# Patient Record
Sex: MALE | Race: WHITE | Employment: FULL TIME | URBAN - METROPOLITAN AREA
[De-identification: names, ages, dates, MRNs, and addresses within clinical notes are randomized per-mention and may not be internally consistent; named-entity substitution may affect disease eponyms.]

---

## 2018-12-11 ENCOUNTER — HOSPITAL ENCOUNTER (EMERGENCY)
Facility: CLINIC | Age: 53
Discharge: HOME | End: 2018-12-11
Attending: EMERGENCY MEDICINE | Admitting: EMERGENCY MEDICINE
Payer: COMMERCIAL

## 2018-12-11 VITALS
WEIGHT: 151 LBS | DIASTOLIC BLOOD PRESSURE: 100 MMHG | OXYGEN SATURATION: 99 % | TEMPERATURE: 97.6 F | HEIGHT: 67 IN | SYSTOLIC BLOOD PRESSURE: 156 MMHG | BODY MASS INDEX: 23.7 KG/M2 | RESPIRATION RATE: 14 BRPM | HEART RATE: 77 BPM

## 2018-12-11 DIAGNOSIS — R79.89 ELEVATED LFTS: ICD-10-CM

## 2018-12-11 DIAGNOSIS — R00.2 PALPITATIONS: ICD-10-CM

## 2018-12-11 LAB
ALBUMIN SERPL-MCNC: 4.2 G/DL (ref 3.4–5)
ALP SERPL-CCNC: 67 U/L (ref 40–150)
ALT SERPL W P-5'-P-CCNC: 92 U/L (ref 0–70)
ANION GAP SERPL CALCULATED.3IONS-SCNC: 9 MMOL/L (ref 3–14)
AST SERPL W P-5'-P-CCNC: 81 U/L (ref 0–45)
BASOPHILS # BLD AUTO: 0.1 10E9/L (ref 0–0.2)
BASOPHILS NFR BLD AUTO: 0.9 %
BILIRUB SERPL-MCNC: 0.9 MG/DL (ref 0.2–1.3)
BUN SERPL-MCNC: 6 MG/DL (ref 7–30)
CALCIUM SERPL-MCNC: 9.1 MG/DL (ref 8.5–10.1)
CHLORIDE SERPL-SCNC: 107 MMOL/L (ref 94–109)
CK SERPL-CCNC: 115 U/L (ref 30–300)
CO2 SERPL-SCNC: 23 MMOL/L (ref 20–32)
CREAT SERPL-MCNC: 0.78 MG/DL (ref 0.66–1.25)
DIFFERENTIAL METHOD BLD: ABNORMAL
EOSINOPHIL # BLD AUTO: 0.3 10E9/L (ref 0–0.7)
EOSINOPHIL NFR BLD AUTO: 5 %
ERYTHROCYTE [DISTWIDTH] IN BLOOD BY AUTOMATED COUNT: 12.7 % (ref 10–15)
GFR SERPL CREATININE-BSD FRML MDRD: >90 ML/MIN/1.7M2
GLUCOSE SERPL-MCNC: 78 MG/DL (ref 70–99)
HCT VFR BLD AUTO: 41.6 % (ref 40–53)
HGB BLD-MCNC: 14.9 G/DL (ref 13.3–17.7)
IMM GRANULOCYTES # BLD: 0 10E9/L (ref 0–0.4)
IMM GRANULOCYTES NFR BLD: 0.2 %
INTERPRETATION ECG - MUSE: NORMAL
LYMPHOCYTES # BLD AUTO: 1.2 10E9/L (ref 0.8–5.3)
LYMPHOCYTES NFR BLD AUTO: 19.2 %
MAGNESIUM SERPL-MCNC: 2 MG/DL (ref 1.6–2.3)
MCH RBC QN AUTO: 33.9 PG (ref 26.5–33)
MCHC RBC AUTO-ENTMCNC: 35.8 G/DL (ref 31.5–36.5)
MCV RBC AUTO: 95 FL (ref 78–100)
MONOCYTES # BLD AUTO: 0.6 10E9/L (ref 0–1.3)
MONOCYTES NFR BLD AUTO: 9.6 %
NEUTROPHILS # BLD AUTO: 4.2 10E9/L (ref 1.6–8.3)
NEUTROPHILS NFR BLD AUTO: 65.1 %
NRBC # BLD AUTO: 0 10*3/UL
NRBC BLD AUTO-RTO: 0 /100
PLATELET # BLD AUTO: 167 10E9/L (ref 150–450)
POTASSIUM SERPL-SCNC: 3.7 MMOL/L (ref 3.4–5.3)
PROT SERPL-MCNC: 8.3 G/DL (ref 6.8–8.8)
RBC # BLD AUTO: 4.39 10E12/L (ref 4.4–5.9)
SODIUM SERPL-SCNC: 139 MMOL/L (ref 133–144)
TROPONIN I SERPL-MCNC: <0.015 UG/L (ref 0–0.04)
TSH SERPL DL<=0.005 MIU/L-ACNC: 2.53 MU/L (ref 0.4–4)
WBC # BLD AUTO: 6.5 10E9/L (ref 4–11)

## 2018-12-11 PROCEDURE — 93005 ELECTROCARDIOGRAM TRACING: CPT

## 2018-12-11 PROCEDURE — 82550 ASSAY OF CK (CPK): CPT | Performed by: EMERGENCY MEDICINE

## 2018-12-11 PROCEDURE — 84484 ASSAY OF TROPONIN QUANT: CPT | Performed by: EMERGENCY MEDICINE

## 2018-12-11 PROCEDURE — 83735 ASSAY OF MAGNESIUM: CPT | Performed by: EMERGENCY MEDICINE

## 2018-12-11 PROCEDURE — 84443 ASSAY THYROID STIM HORMONE: CPT | Performed by: EMERGENCY MEDICINE

## 2018-12-11 PROCEDURE — 80053 COMPREHEN METABOLIC PANEL: CPT | Performed by: EMERGENCY MEDICINE

## 2018-12-11 PROCEDURE — 99284 EMERGENCY DEPT VISIT MOD MDM: CPT | Mod: 25

## 2018-12-11 PROCEDURE — 25000128 H RX IP 250 OP 636: Performed by: EMERGENCY MEDICINE

## 2018-12-11 PROCEDURE — 85025 COMPLETE CBC W/AUTO DIFF WBC: CPT | Performed by: EMERGENCY MEDICINE

## 2018-12-11 PROCEDURE — 96361 HYDRATE IV INFUSION ADD-ON: CPT

## 2018-12-11 PROCEDURE — 96360 HYDRATION IV INFUSION INIT: CPT

## 2018-12-11 RX ADMIN — SODIUM CHLORIDE 1000 ML: 9 INJECTION, SOLUTION INTRAVENOUS at 16:39

## 2018-12-11 ASSESSMENT — ENCOUNTER SYMPTOMS
LIGHT-HEADEDNESS: 1
NAUSEA: 0
WEAKNESS: 0
CHILLS: 0
VOMITING: 0
FEVER: 0
PALPITATIONS: 1
SHORTNESS OF BREATH: 0
NUMBNESS: 0

## 2018-12-11 ASSESSMENT — MIFFLIN-ST. JEOR: SCORE: 1483.56

## 2018-12-11 NOTE — ED AVS SNAPSHOT
"     EMERGENCY DEPARTMENT: [phone]                                              INTERAGENCY TRANSFER FORM - LAB / IMAGING / EKG / EMG RESULTS   2018                   Hospital Admission Date: 2018  DIANE AKERS   : 1965  Sex: Male         Attending Provider:  Patel Caldera MD    Allergies:  No Known Allergies    Infection:  None   Service:  EMERGENCY ME    Ht:  1.702 m (5' 7\")   Wt:  68.5 kg (151 lb)   Admission Wt:  68.5 kg (151 lb)    BMI:  23.65 kg/m 2   BSA:  1.8 m 2            Patient PCP Information     Provider PCP Type    REYES DOMINGUEZ General         Lab Results - 3 Days      CK total [[id number]]  Resulted: 18 181, Result status: Final result   Ordering provider:  Patel Caldera MD  18 163 Resulting lab:  [facility]    Specimen Information    Type Source Collected On       18 1637          Components    Component Value Reference Range Flag Lab   CK Total 115 30 - 300 U/L   FrStHsLb            TSH with free T4 reflex [[id number]]  Resulted: 18 1720, Result status: Final result   Ordering provider:  Patel Caldera MD  18 1637 Resulting lab:  [facility]    Specimen Information    Type Source Collected On       18 1637          Components    Component Value Reference Range Flag Lab   TSH 2.53 0.40 - 4.00 mU/L   FrStHsLb            Magnesium [[id number]]  Resulted: 18 1717, Result status: Final result   Ordering provider:  Patel Caldera MD  18 1637 Resulting lab:  [facility]    Specimen Information    Type Source Collected On       18 1637          Components    Component Value Reference Range Flag Lab   Magnesium 2.0 1.6 - 2.3 mg/dL   FrStHsLb            Comprehensive metabolic panel [[id number]] (Abnormal)  Resulted: 18 1707, Result status: Final result   Ordering provider:  Patel Caldera MD  18 1638 Resulting lab:  [facility]    " Specimen Information    Type Source Collected On   Blood   12/11/18 1637          Components    Component Value Reference Range Flag Lab   Sodium 139 133 - 144 mmol/L   FrStHsLb   Potassium 3.7 3.4 - 5.3 mmol/L   FrStHsLb   Chloride 107 94 - 109 mmol/L   FrStHsLb   Carbon Dioxide 23 20 - 32 mmol/L   FrStHsLb   Anion Gap 9 3 - 14 mmol/L   FrStHsLb   Glucose 78 70 - 99 mg/dL   FrStHsLb   Urea Nitrogen 6 7 - 30 mg/dL  FrStHsLb   Creatinine 0.78 0.66 - 1.25 mg/dL   FrStHsLb   GFR Estimate >90 >60 mL/min/1.7m2   FrStHsLb   Comment:  Non  GFR Calc   GFR Estimate If Black >90 >60 mL/min/1.7m2   FrStHsLb   Comment:  African American GFR Calc   Calcium 9.1 8.5 - 10.1 mg/dL   FrStHsLb   Bilirubin Total 0.9 0.2 - 1.3 mg/dL   FrStHsLb   Albumin 4.2 3.4 - 5.0 g/dL   FrStHsLb   Protein Total 8.3 6.8 - 8.8 g/dL   FrStHsLb   Alkaline Phosphatase 67 40 - 150 U/L   FrStHsLb   ALT 92 0 - 70 U/L H FrStHsLb   AST 81 0 - 45 U/L H FrStHsLb            Troponin I [[id number]]  Resulted: 12/11/18 1707, Result status: Final result   Ordering provider:  Patel Caldera MD  12/11/18 9513 Resulting lab:  [facility]    Specimen Information    Type Source Collected On   Blood   12/11/18 1637          Components    Component Value Reference Range Flag Lab   Troponin I ES <0.015 0.000 - 0.045 ug/L   FrStHsLb   Comment:         The 99th percentile for upper reference range is 0.045 ug/L.  Troponin values   in the range of 0.045 - 0.120 ug/L may be associated with risks of adverse   clinical events.              CBC with platelets differential [[id number]] (Abnormal)  Resulted: 12/11/18 1647, Result status: Final result   Ordering provider:  Patel Caldera MD  12/11/18 4442 Resulting lab:  [facility]    Specimen Information    Type Source Collected On   Blood   12/11/18 0187          Components    Component Value Reference Range Flag Lab   WBC 6.5 4.0 - 11.0 10e9/L   FrStHsLb   RBC Count 4.39 4.4 -  5.9 10e12/L  FrStHsLb   Hemoglobin 14.9 13.3 - 17.7 g/dL   FrStHsLb   Hematocrit 41.6 40.0 - 53.0 %   FrStHsLb   MCV 95 78 - 100 fl   FrStHsLb   MCH 33.9 26.5 - 33.0 pg H FrStHsLb   MCHC 35.8 31.5 - 36.5 g/dL   FrStHsLb   RDW 12.7 10.0 - 15.0 %   FrStHsLb   Platelet Count 167 150 - 450 10e9/L   FrStHsLb   Diff Method Automated Method     FrStHsLb   % Neutrophils 65.1 %   FrStHsLb   % Lymphocytes 19.2 %   FrStHsLb   % Monocytes 9.6 %   FrStHsLb   % Eosinophils 5.0 %   FrStHsLb   % Basophils 0.9 %   FrStHsLb   % Immature Granulocytes 0.2 %   FrStHsLb   Nucleated RBCs 0 0 /100   FrStHsLb   Absolute Neutrophil 4.2 1.6 - 8.3 10e9/L   FrStHsLb   Absolute Lymphocytes 1.2 0.8 - 5.3 10e9/L   FrStHsLb   Absolute Monocytes 0.6 0.0 - 1.3 10e9/L   FrStHsLb   Absolute Eosinophils 0.3 0.0 - 0.7 10e9/L   FrStHsLb   Absolute Basophils 0.1 0.0 - 0.2 10e9/L   FrStHsLb   Abs Immature Granulocytes 0.0 0 - 0.4 10e9/L   FrStHsLb   Absolute Nucleated RBC 0.0     FrStHsLb            Testing Performed By     Lab - Abbreviation Name Director Address Valid Date Range    14 - FrStHsLb [facility] Unknown [address] [place] [address] 05/08/15 1057 - Present            Unresulted Labs (24h ago, onward)    None      Encounter-Level Documents:    There are no encounter-level documents.     Order-Level Documents:    There are no order-level documents.

## 2018-12-11 NOTE — ED PROVIDER NOTES
"  History     Chief Complaint:  Palpitations    The history is provided by the patient.      Catrachito Vazquez is a 53 year old male with history of premature ventricular contractions and hyperlipidemia who presents with palpitations. The patient reports that he has had occasional episodes of of PVC's for many years that have seemed to increase in frequency during stressful times in his life. Today, he reports that he was sitting in a meeting about 5 hours ago when he had an onset of palpitations that felt similar to his past episodes of PVC's. He states that it was a normal meeting and that he was not feeling particularly stressed. The patient states that usually, these episodes resolve on their own, but that this episode lasted about 3 hours, which is longer than usual. He reports that the PVC's seemed to be occurring with greater frequency than they have during his past episodes. The patient reports that he began feeling lightheaded while he was driving, which, along with the duration and frequency of the PVC's, prompted him to present to the ED. The patient denies any chest pain or difficulty breathing. He denies leg swelling or recent illness or fever. The patient states that he did have a lot of coffee this morning before the onset of the episode. He denies syncope, and states that he has not fainted in the past with these episodes. The patient reports that he no longer feels like he is having PVC's here in the ED. He reports he now feels \"normal\" and does not have any other symptoms at this time.   Of note, the patient states that he has not been seen by cardiology for this specific issue in the past.     Allergies:  No known drug allergies      Medications:    Hygroton  Flexeril  Norco  Acular-LS  Zofran    Past Medical History:    Anxiety  PVC's  Irritable bowel syndrome  Kidney stones  Hyperlipidemia    Past Surgical History:    Hernia repair, incisional     Family History:    Heart disease  Diverticulitis " "    Social History:  PCP: [place] Velásquez  Marital Status:    Smokeless tobacco user  Alcohol use: Yes, 1-3 glasses of wine per night       Review of Systems   Constitutional: Negative for chills and fever.   Respiratory: Negative for shortness of breath.    Cardiovascular: Positive for palpitations. Negative for chest pain and leg swelling.   Gastrointestinal: Negative for nausea and vomiting.   Neurological: Positive for light-headedness. Negative for weakness and numbness.   All other systems reviewed and are negative.      Physical Exam     Patient Vitals for the past 24 hrs:   BP Temp Temp src Pulse Heart Rate Resp SpO2 Height Weight   12/11/18 1730 (!) 156/100 -- -- -- 74 14 99 % -- --   12/11/18 1700 (!) 143/94 -- -- -- 73 16 97 % -- --   12/11/18 1645 (!) 146/103 -- -- -- 72 16 96 % -- --   12/11/18 1606 (!) 143/91 97.6  F (36.4  C) Temporal 77 -- 16 97 % 1.702 m (5' 7\") 68.5 kg (151 lb)        Physical Exam  General: Well appearing, nontoxic. Resting comfortably  Head:  Scalp, face, and head appear normal  Eyes:  Pupils are equal, round, and reactive to light    Conjunctivae non-injected and sclerae white  ENT:    The external nose is normal    Pinnae are normal    The oropharynx is normal, mucous membranes moist    Uvula is in the midline  Neck:  Normal range of motion    There is no rigidity noted    Trachea is in the midline  CV:  Regular rate and rhythm     Normal S1/S2, no S3/S4    No murmur or rub. Radial pulses 2+ bilaterally   Resp:  Lungs are clear and equal bilaterally    There is no tachypnea    No increased work of breathing    No rales, wheezing, or rhonchi  GI:  Abdomen is soft, no rigidity or guarding    No distension, or mass    No tenderness or rebound tenderness   MS:  Normal muscular tone    Symmetric motor strength    No lower extremity edema, no calf tenderness  Skin:  No rash or acute skin lesions noted  Neuro: Awake and alert    Speech is normal and fluent    Moves all " extremities spontaneously  Psych:  Normal affect.  Appropriate interactions.     Emergency Department Course   ECG (16:12:51):  Rate 78 bpm. MD interval 158. QRS duration 84. QT/QTc 384/437. P-R-T axes 40 20 30.   Normal Sinus rhythm  Normal ECG  No significant change was found  Interpreted at 1728 by Patel Caldera MD.     Laboratory:  CBC: WNL (WBC 6.5, HGB 14.9, )     CMP: BUN 6(L), ALT 92(H), AST 81(H), o/w WNL (creatinine 0.78)    Troponin I: <0.015  Magnesium: 2.0  TSH with free T4 reflux: 2.53    Interventions:  1639: NS 1L IV Bolus      Emergency Department Course:  Past medical records, nursing notes, and vitals reviewed.  1725: I performed an exam of the patient and obtained history, as documented above.  IV inserted and blood drawn.     1800: I rechecked the patient. Explained findings to  The patient.    I rechecked the patient. Findings and plan explained to the Patient. Patient discharged home with instructions regarding supportive care, medications, and reasons to return. The importance of close follow-up was reviewed.          Impression & Plan      Medical Decision Making:  Catrachito Vazquez is a 53 year old male who presents for evaluation of palpitations.  Initial ECG shows NSR without ischemia, dysrhythmia, or PVCs/PACs.  A broad differential diagnosis was considered including SVT, Atrial fibrillation, ventricular arrhythmia, thyroid disease, acute electrolyte abnormality,  drugs/medications, caffeine intake or other stimulants, medication side effect, anemia, heart disease, PE, etc.  The workup and exam here in ED would indicate that supportive outpatient management is indicated.  There is no indication of ACS, PE or malignant arrhythmia.   Doubt acute coronary syndrome given symptoms and exam.  The remainder of his ED workup is otherwise unremarkable with the exception of mildly elevated LFTs. Patient denies RUQ pain. He endorses alcohol use. I recommended PCP follow up of these to  ensure they are improving. I feel that this is unrelated to his palpitations. Return precautions were discussed with patient. The patient's questions were answered and the patient was agreeable with discharge.      Diagnosis:    ICD-10-CM   1. Palpitations R00.2   2. Elevated LFTs R94.5       Disposition: discharged to home      Megan Beh  12/11/2018    EMERGENCY DEPARTMENT  I, Megan Beh, am serving as a scribe at 5:25 PM on 12/11/2018 to document services personally performed by Patel Caldera MD based on my observations and the provider's statements to me.       Patel Caldera MD  12/13/18 4620

## 2018-12-11 NOTE — ED AVS SNAPSHOT
Emergency Department  [address] [place] [address]  Phone:  [phone]  Fax:  [phone]                                    Catrachito Vazquez   MRN: [id number]    Department:   Emergency Department   Date of Visit:  12/11/2018           After Visit Summary Signature Page    I have received my discharge instructions, and my questions have been answered. I have discussed any challenges I see with this plan with the nurse or doctor.    ..........................................................................................................................................  Patient/Patient Representative Signature      ..........................................................................................................................................  Patient Representative Print Name and Relationship to Patient    ..................................................               ................................................  Date                                   Time    ..........................................................................................................................................  Reviewed by Signature/Title    ...................................................              ..............................................  Date                                               Time          22EPIC Rev 08/18

## 2018-12-12 NOTE — DISCHARGE INSTRUCTIONS
YOUR LIVER FUNCTION TESTS WERE ABNORMAL TODAY, THIS CAN BE CAUSED BY MANY THINGS INCLUDING DRINKING TOO MUCH ALCOHOL, HEPATITIS INFECTION, MEDICATIONS AND OTHER CAUSES. YOUR PRIMARY CARE PHYSICIAN SHOULD RECHECK THESE BLOOD TESTS IN 1-2 WEEKS TO MAKE SURE THEY ARE IMPROVING.     Discharge Instructions  Palpitations    Palpitations are an unusual awareness of your heartbeat. People often describe this as the heart skipping, fluttering, racing, irregular, or pounding. At this time, your provider has found no signs that your palpitations are due to a serious or life-threatening condition. However, sometimes there is a serious problem that does not show up right away.    Palpitations can be caused by caffeine, cigarettes, diet pills, energy drinks or supplements, other stimulants, and medications and street drugs. They can also be caused by anxiety, hormone conditions such as high thyroid, and other medical conditions. Sometimes they are a sign of abnormal rhythm in the heart. At this time, your provider did not find any dangerous cause of your symptoms.    Generally, every Emergency Department visit should have a follow-up clinic visit with either a primary or a specialty clinic/provider. Please follow-up as instructed by your emergency provider today.    Return to the Emergency Department if:  You get chest pain or tightness.  You are short of breath.  You get very weak or tired.  You pass out or faint.  Your heart rate is over 120 beats per minute for more than 10 minutes while you are resting.   You have anything else that worries you.    What can I do to help myself?  Fill any prescriptions the provider gave you and take them right away.   Follow your provider?s instructions about the prescription medicines you are on. Sometimes the provider may tell you to stop taking a medicine or change the dose.  If you smoke, this may be a good time to quit! The less you can smoke, the better.  Do not use energy drinks, diet  pills, or stimulants. Limit your use of caffeine.  If you were given a prescription for medicine here today, be sure to read all of the information (including the package insert) that comes with your prescription.  This will include important information about the medicine, its side effects, and any warnings that you need to know about.  The pharmacist who fills the prescription can provide more information and answer questions you may have about the medicine.  If you have questions or concerns that the pharmacist cannot address, please call or return to the Emergency Department.     Remember that you can always come back to the Emergency Department if you are not able to see your regular provider in the amount of time listed above, if you get any new symptoms, or if there is anything that worries you.